# Patient Record
Sex: MALE | Race: WHITE | Employment: FULL TIME | URBAN - METROPOLITAN AREA
[De-identification: names, ages, dates, MRNs, and addresses within clinical notes are randomized per-mention and may not be internally consistent; named-entity substitution may affect disease eponyms.]

---

## 2017-07-14 ENCOUNTER — OFFICE VISIT (OUTPATIENT)
Dept: FAMILY MEDICINE | Facility: CLINIC | Age: 32
End: 2017-07-14
Payer: COMMERCIAL

## 2017-07-14 VITALS
TEMPERATURE: 98.4 F | OXYGEN SATURATION: 98 % | SYSTOLIC BLOOD PRESSURE: 138 MMHG | WEIGHT: 254 LBS | DIASTOLIC BLOOD PRESSURE: 88 MMHG | HEART RATE: 68 BPM | HEIGHT: 72 IN | BODY MASS INDEX: 34.4 KG/M2

## 2017-07-14 DIAGNOSIS — E66.9 NON MORBID OBESITY, UNSPECIFIED OBESITY TYPE: ICD-10-CM

## 2017-07-14 DIAGNOSIS — B00.1 RECURRENT COLD SORES: ICD-10-CM

## 2017-07-14 DIAGNOSIS — Z13.220 SCREENING FOR HYPERLIPIDEMIA: ICD-10-CM

## 2017-07-14 DIAGNOSIS — Z00.00 ROUTINE GENERAL MEDICAL EXAMINATION AT A HEALTH CARE FACILITY: Primary | ICD-10-CM

## 2017-07-14 DIAGNOSIS — Z13.1 SCREENING FOR DIABETES MELLITUS: ICD-10-CM

## 2017-07-14 PROCEDURE — 99395 PREV VISIT EST AGE 18-39: CPT | Performed by: FAMILY MEDICINE

## 2017-07-14 PROCEDURE — 36415 COLL VENOUS BLD VENIPUNCTURE: CPT | Performed by: FAMILY MEDICINE

## 2017-07-14 PROCEDURE — 80053 COMPREHEN METABOLIC PANEL: CPT | Performed by: FAMILY MEDICINE

## 2017-07-14 PROCEDURE — 80061 LIPID PANEL: CPT | Performed by: FAMILY MEDICINE

## 2017-07-14 RX ORDER — VALACYCLOVIR HYDROCHLORIDE 500 MG/1
500 TABLET, FILM COATED ORAL 2 TIMES DAILY
Qty: 6 TABLET | Refills: 3 | Status: SHIPPED | OUTPATIENT
Start: 2017-07-14 | End: 2020-05-04

## 2017-07-14 NOTE — PROGRESS NOTES
SUBJECTIVE:   CC: Randy Connor is an 32 year old male who presents for preventative health visit.     Physical   Annual:     Getting at least 3 servings of Calcium per day::  Yes    Bi-annual eye exam::  Yes    Dental care twice a year::  NO    Sleep apnea or symptoms of sleep apnea::  Daytime drowsiness and Sleep apnea    Diet::  Regular (no restrictions) and Breakfast skipped    Frequency of exercise::  1 day/week    Duration of exercise::  15-30 minutes    Taking medications regularly::  Yes    Medication side effects::  None    Additional concerns today::  No  Additional: feels like edema on left leg              Today's PHQ-2 Score:   PHQ-2 ( 1999 Pfizer) 7/14/2017   Q1: Little interest or pleasure in doing things 0   Q2: Feeling down, depressed or hopeless 0   PHQ-2 Score 0   Q1: Little interest or pleasure in doing things Not at all   Q2: Feeling down, depressed or hopeless Not at all   PHQ-2 Score 0       Abuse: Current or Past(Physical, Sexual or Emotional)- No  Do you feel safe in your environment - Yes    Social History   Substance Use Topics     Smoking status: Never Smoker     Smokeless tobacco: Never Used     Alcohol use 0.0 oz/week     0 Standard drinks or equivalent per week      Comment: 6 beers month     The patient does not drink >3 drinks per day nor >7 drinks per week.    Last PSA: No results found for: PSA    Reviewed orders with patient. Reviewed health maintenance and updated orders accordingly - Yes       Reviewed and updated as needed this visit by clinical staff    Reviewed and updated as needed this visit by Provider    Mild leg swelling.     Works at [employer] and some stress at work.     ROS:  C: NEGATIVE for fever, chills, change in weight  I: NEGATIVE for worrisome rashes, moles or lesions  E: NEGATIVE for vision changes or irritation  ENT: NEGATIVE for ear, mouth and throat problems  R: NEGATIVE for significant cough or SOB  CV: NEGATIVE for chest pain, palpitations or peripheral  "edema  GI: NEGATIVE for nausea, abdominal pain, heartburn, or change in bowel habits   male: negative for dysuria, hematuria, decreased urinary stream, erectile dysfunction, urethral discharge  M: NEGATIVE for significant arthralgias or myalgia  N: NEGATIVE for weakness, dizziness or paresthesias  E: NEGATIVE for temperature intolerance, skin/hair changes  P: NEGATIVE for changes in mood or affect    OBJECTIVE:   /88 (Cuff Size: Adult Large)  Pulse 68  Temp 98.4  F (36.9  C) (Oral)  Ht 6' 0.25\" (1.835 m)  Wt 254 lb (115.2 kg)  SpO2 98%  BMI 34.21 kg/m2    EXAM:  GENERAL: healthy, alert and no distress  EYES: Eyes grossly normal to inspection, PERRL and conjunctivae and sclerae normal  HENT: ear canals and TM's normal, nose and mouth without ulcers or lesions  NECK: no adenopathy, no asymmetry, masses, or scars and thyroid normal to palpation  RESP: lungs clear to auscultation - no rales, rhonchi or wheezes  CV: regular rate and rhythm, normal S1 S2, no S3 or S4, no murmur, click or rub, no peripheral edema and peripheral pulses strong  ABDOMEN: soft, nontender, no hepatosplenomegaly, no masses and bowel sounds normal   (male): testicles normal without atrophy or masses, no hernias and penis normal without urethral discharge  MS: no gross musculoskeletal defects noted, mild leg edema  SKIN: no suspicious lesions or rashes  NEURO: Normal strength and tone, mentation intact and speech normal  PSYCH: mentation appears normal, affect normal/bright    ASSESSMENT/PLAN:   1. Routine general medical examination at a health care facility       2. Screening for diabetes mellitus  - Comprehensive metabolic panel    3. Screening for hyperlipidemia  - LIPID REFLEX TO DIRECT LDL PANEL    4. Non morbid obesity, unspecified obesity type       5. Recurrent cold sores     - valACYclovir (VALTREX) 500 MG tablet; Take 1 tablet (500 mg) by mouth 2 times daily  Dispense: 6 tablet; Refill: 3  - Comprehensive metabolic " panel    COUNSELING:   Reviewed preventive health counseling, as reflected in patient instructions       Regular exercise       Healthy diet/nutrition       Vision screening       Hearing screening    BP Screening:   Last 3 BP Readings:    BP Readings from Last 3 Encounters:   07/14/17 138/88   04/11/16 130/86   08/21/15 120/76       The following was recommended to the patient:  Re-screen BP within a year and recommended lifestyle modifications     reports that he has never smoked. He has never used smokeless tobacco.    Estimated body mass index is 33.02 kg/(m^2) as calculated from the following:    Height as of 4/11/16: 6' (1.829 m).    Weight as of 4/11/16: 243 lb 8 oz (110.5 kg).   Weight management plan: Discussed healthy diet and exercise guidelines and patient will follow up in 12 months in clinic to re-evaluate.    Counseling Resources:  ATP IV Guidelines  Pooled Cohorts Equation Calculator  FRAX Risk Assessment  ICSI Preventive Guidelines  Dietary Guidelines for Americans, 2010  USDA's MyPlate  ASA Prophylaxis  Lung CA Screening    Nando Luciano MD, MD  [facility]  Answers for HPI/ROS submitted by the patient on 7/14/2017   PHQ-2 Score: 0

## 2017-07-14 NOTE — MR AVS SNAPSHOT
After Visit Summary   7/14/2017    Randy Connor    MRN: [id number]           Patient Information     Date Of Birth          1985        Visit Information        Provider Department      7/14/2017 4:00 PM Nando Luciano MD [facility]        Today's Diagnoses     Routine general medical examination at a health care facility    -  1    Screening for diabetes mellitus        Screening for hyperlipidemia        Non morbid obesity, unspecified obesity type        Recurrent cold sores          Care Instructions      Preventive Health Recommendations  Male Ages 26 - 39    Yearly exam:             See your health care provider every year in order to  o   Review health changes.   o   Discuss preventive care.    o   Review your medicines if your doctor has prescribed any.    You should be tested each year for STDs (sexually transmitted diseases), if you re at risk.     After age 35, talk to your provider about cholesterol testing. If you are at risk for heart disease, have your cholesterol tested at least every 5 years.     If you are at risk for diabetes, you should have a diabetes test (fasting glucose).  Shots: Get a flu shot each year. Get a tetanus shot every 10 years.     Nutrition:    Eat at least 5 servings of fruits and vegetables daily.     Eat whole-grain bread, whole-wheat pasta and brown rice instead of white grains and rice.     Talk to your provider about Calcium and Vitamin D.     Lifestyle    Exercise for at least 150 minutes a week (30 minutes a day, 5 days a week). This will help you control your weight and prevent disease.     Limit alcohol to one drink per day.     No smoking.     Wear sunscreen to prevent skin cancer.     See your dentist every six months for an exam and cleaning.             Follow-ups after your visit        Who to contact     If you have questions or need follow up information about today's clinic visit or your schedule please contact  "[facility] directly at [phone].  Normal or non-critical lab and imaging results will be communicated to you by MyChart, letter or phone within 4 business days after the clinic has received the results. If you do not hear from us within 7 days, please contact the clinic through [employer] or phone. If you have a critical or abnormal lab result, we will notify you by phone as soon as possible.  Submit refill requests through [employer] or call your pharmacy and they will forward the refill request to us. Please allow 3 business days for your refill to be completed.          Additional Information About Your Visit        [employer] Information     [employer] gives you secure access to your electronic health record. If you see a primary care provider, you can also send messages to your care team and make appointments. If you have questions, please call your primary care clinic.  If you do not have a primary care provider, please call [phone] and they will assist you.        Care EveryWhere ID     This is your Care EveryWhere ID. This could be used by other organizations to access your [place] medical records  [id number]        Your Vitals Were     Pulse Temperature Height Pulse Oximetry BMI (Body Mass Index)       68 98.4  F (36.9  C) (Oral) 6' 0.25\" (1.835 m) 98% 34.21 kg/m2        Blood Pressure from Last 3 Encounters:   07/14/17 138/88   04/11/16 130/86   08/21/15 120/76    Weight from Last 3 Encounters:   07/14/17 254 lb (115.2 kg)   04/11/16 243 lb 8 oz (110.5 kg)   08/21/15 240 lb (108.9 kg)              We Performed the Following     Comprehensive metabolic panel     LIPID REFLEX TO DIRECT LDL PANEL          Today's Medication Changes          These changes are accurate as of: 7/14/17  4:25 PM.  If you have any questions, ask your nurse or doctor.               Stop taking these medicines if you haven't already. Please contact your care team if you have questions.     hydrocortisone 0.2 % cream "   Commonly known as:  WESTCORT   Stopped by:  Nando Luciano MD                Where to get your medicines      These medications were sent to [facility] [place], [place] - [address]  [address], [facility] [address]     Phone:  [phone]     valACYclovir 500 MG tablet                Primary Care Provider Office Phone # Fax #    Franchesca Eliza Cason [phone]       [facility] [address]  [facility] [place] [address]        Equal Access to Services     [facility]: Hadii aad ku hadasho Soomaali, waaxda luqadaha, qaybta kaalmada adeegyada, natividad cordero . So [facility] [phone].    ATENCIÓN: Si habla español, tiene a raman disposición servicios gratuitos de asistencia lingüística. Llame al [phone].    We comply with applicable federal civil rights laws and [place] laws. We do not discriminate on the basis of race, color, national origin, age, disability sex, sexual orientation or gender identity.            Thank you!     Thank you for choosing [facility]  for your care. Our goal is always to provide you with excellent care. Hearing back from our patients is one way we can continue to improve our services. Please take a few minutes to complete the written survey that you may receive in the mail after your visit with us. Thank you!             Your Updated Medication List - Protect others around you: Learn how to safely use, store and throw away your medicines at www.disposemymeds.org.          This list is accurate as of: 7/14/17  4:25 PM.  Always use your most recent med list.                   Brand Name Dispense Instructions for use Diagnosis    NO ACTIVE MEDICATIONS           valACYclovir 500 MG tablet    VALTREX    6 tablet    Take 1 tablet (500 mg) by mouth 2 times daily    Recurrent cold sores

## 2017-07-14 NOTE — NURSING NOTE
"Chief Complaint   Patient presents with     Physical     pt is fasting        Initial /88 (Cuff Size: Adult Large)  Pulse 68  Temp 98.4  F (36.9  C) (Oral)  Ht 6' 0.25\" (1.835 m)  Wt 254 lb (115.2 kg)  SpO2 98%  BMI 34.21 kg/m2 Estimated body mass index is 34.21 kg/(m^2) as calculated from the following:    Height as of this encounter: 6' 0.25\" (1.835 m).    Weight as of this encounter: 254 lb (115.2 kg).  Medication Reconciliation: complete     Xi Trejo, MITCH      "

## 2017-07-17 LAB
ALBUMIN SERPL-MCNC: 3.8 G/DL (ref 3.4–5)
ALP SERPL-CCNC: 52 U/L (ref 40–150)
ALT SERPL W P-5'-P-CCNC: 55 U/L (ref 0–70)
ANION GAP SERPL CALCULATED.3IONS-SCNC: 6 MMOL/L (ref 3–14)
AST SERPL W P-5'-P-CCNC: 23 U/L (ref 0–45)
BILIRUB SERPL-MCNC: 0.4 MG/DL (ref 0.2–1.3)
BUN SERPL-MCNC: 12 MG/DL (ref 7–30)
CALCIUM SERPL-MCNC: 9.1 MG/DL (ref 8.5–10.1)
CHLORIDE SERPL-SCNC: 109 MMOL/L (ref 94–109)
CHOLEST SERPL-MCNC: 209 MG/DL
CO2 SERPL-SCNC: 27 MMOL/L (ref 20–32)
CREAT SERPL-MCNC: 1.07 MG/DL (ref 0.66–1.25)
GFR SERPL CREATININE-BSD FRML MDRD: 79 ML/MIN/1.7M2
GLUCOSE SERPL-MCNC: 98 MG/DL (ref 70–99)
HDLC SERPL-MCNC: 42 MG/DL
LDLC SERPL CALC-MCNC: 99 MG/DL
NONHDLC SERPL-MCNC: 167 MG/DL
POTASSIUM SERPL-SCNC: 4 MMOL/L (ref 3.4–5.3)
PROT SERPL-MCNC: 7.5 G/DL (ref 6.8–8.8)
SODIUM SERPL-SCNC: 142 MMOL/L (ref 133–144)
TRIGL SERPL-MCNC: 341 MG/DL

## 2019-11-04 ENCOUNTER — HEALTH MAINTENANCE LETTER (OUTPATIENT)
Age: 34
End: 2019-11-04

## 2020-05-04 ENCOUNTER — OFFICE VISIT (OUTPATIENT)
Dept: URGENT CARE | Facility: URGENT CARE | Age: 35
End: 2020-05-04
Payer: COMMERCIAL

## 2020-05-04 VITALS
BODY MASS INDEX: 35.69 KG/M2 | SYSTOLIC BLOOD PRESSURE: 160 MMHG | WEIGHT: 265 LBS | DIASTOLIC BLOOD PRESSURE: 98 MMHG | HEART RATE: 103 BPM | TEMPERATURE: 98.6 F | OXYGEN SATURATION: 98 %
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
## 2020-05-04 DIAGNOSIS — R03.0 ELEVATED BLOOD PRESSURE READING WITHOUT DIAGNOSIS OF HYPERTENSION: ICD-10-CM

## 2020-05-04 PROCEDURE — 99214 OFFICE O/P EST MOD 30 MIN: CPT | Performed by: NURSE PRACTITIONER

## 2020-05-04 RX ORDER — CEPHALEXIN 500 MG/1
500 CAPSULE ORAL 2 TIMES DAILY
Qty: 14 CAPSULE | Refills: 0 | Status: SHIPPED | OUTPATIENT
Start: 2020-05-04 | End: 2020-05-11

## 2020-05-04 NOTE — PROGRESS NOTES
SUBJECTIVE:  Randy Connor is a 35 year old male who presents to the clinic today for a rash.  Onset of rash was 4 day(s) ago.   Rash is sudden onset.  Location of the rash: right calf.  Quality/symptoms of rash: painful, discharge and red   Symptoms are mild and rash seems to be stable.  Previous history of a similar rash? No  Recent exposure history: woke up with a possible insect bite 4 days ago. Saw a white pustule on on it and tried picking at it which only made it worse. Now has swelling to the right calf and redness has spread.    Associated symptoms include: nothing.    Past Medical History:   Diagnosis Date     Obese 5/3/2013     Current Outpatient Medications   Medication Sig Dispense Refill     NO ACTIVE MEDICATIONS        Social History     Tobacco Use     Smoking status: Never Smoker     Smokeless tobacco: Never Used   Substance Use Topics     Alcohol use: Yes     Alcohol/week: 0.0 standard drinks     Comment: 6 beers month       ROS:  Review of systems negative except as stated above.    EXAM:   BP (!) 160/98   Pulse 103   Temp 98.6  F (37  C) (Tympanic)   Wt 120.2 kg (265 lb)   SpO2 98%   BMI 35.69 kg/m    GENERAL: alert, no acute distress.  SKIN: Rash description:    Distribution: generalized  Location: right posterior calf    Color: erythema and swelling with a small hole in the center. Measures 9 cm in diameter,  Lesion type: macular, round with warmth and swelling  GENERAL APPEARANCE: healthy, alert and no distress  EYES: EOMI,  PERRL, conjunctiva clear  NECK: supple, non-tender to palpation, no adenopathy noted  RESP: lungs clear to auscultation - no rales, rhonchi or wheezes  CV: regular rates and rhythm, normal S1 S2, no murmur noted    ASSESSMENT:  Infected bug bite  Elevated BP reading    PLAN:  1) See today's orders.  2) Follow-up with primary clinic if not improving  3) Recheck /90

## 2020-05-04 NOTE — PATIENT INSTRUCTIONS
Patient Education     Infected Insect Bite or Sting    When an insect stings you, it injects venom. When an insect bites you, it does not. Stings and bites may cause a local reaction. Or they may cause a reaction that affects your whole body. Bites and stings may become infected. Signs of infection include redness, warmth, pain, drainage of pus, and swelling. Infections will need treatment with antibiotics and should get better over the next 10 days. However, they can sometimes form an abscess (a pocket of pus) that needs to be opened by a healthcare provider to release the pus.   Home care  The following will help you care for your bite or sting at home:    If a stinger is still in your skin, it will need to be removed. Don't use tweezers. Gently scrape the stinger from the side with a firm object such as the side of a credit card. This will loosen it and remove it from your skin.    If itching is a problem, applying ice packs to the sting area will help.    Wash the area with soap and water at least 3 times a day. Apply a topical antibiotic cream or ointment.    You can use an over-the counter antihistamine unless your doctor has given you a prescription antihistamine. You may use antihistamines to reduce itching if large areas of the skin are involved. Use lower doses during the daytime and higher doses at bedtime since the drug may make you sleepy. Don't use an antihistamine if you have glaucoma or if you are a man with trouble urinating due to an enlarged prostate. Some antihistamines cause less drowsiness and are a good choice for daytime use.    If oral antibiotics have been prescribed, be sure to take them as directed until they are all finished.    You may use over-the-counter pain medicine to control pain, unless another pain medicine was prescribed. Talk with your doctor before using acetaminophen or ibuprofen if you have chronic liver or kidney disease. Also talk with your doctor if you have ever had a  "stomach ulcer or gastrointestinal bleeding.  Follow-up care  Follow up with your healthcare provider, or as advised if you don't get better over the next 2 days or if your symptoms get worse.  Call 911  Call 911 if any of these occur:    Swelling of the face, eyelids, mouth, throat, or tongue    Difficulty swallowing or breathing  When to seek medical advice  Call your healthcare provider right away if any of these occur:    Spreading areas of redness or swelling    Fever of 100.4 F (38 C) or higher, or as directed by your healthcare provider    Increased local pain, especially if the area starts feeling \"squishy\" like a water ballon.    Headache, fever, chills, muscle or joint aching, or vomiting,    New rash  Date Last Reviewed: 10/1/2016    7879-5172 [employer]. [address] [place] [address], [place] [address]. All rights reserved. This information is not intended as a substitute for professional medical care. Always follow your healthcare professional's instructions.           "

## 2020-05-06 ENCOUNTER — OFFICE VISIT (OUTPATIENT)
Dept: URGENT CARE | Facility: URGENT CARE | Age: 35
End: 2020-05-06
Payer: COMMERCIAL

## 2020-05-06 VITALS
WEIGHT: 255 LBS | SYSTOLIC BLOOD PRESSURE: 138 MMHG | TEMPERATURE: 98 F | BODY MASS INDEX: 34.35 KG/M2 | OXYGEN SATURATION: 98 % | DIASTOLIC BLOOD PRESSURE: 80 MMHG | HEART RATE: 88 BPM

## 2020-05-06 DIAGNOSIS — L03.115 CELLULITIS OF RIGHT LEG: Primary | ICD-10-CM

## 2020-05-06 PROCEDURE — 99214 OFFICE O/P EST MOD 30 MIN: CPT | Mod: 25 | Performed by: FAMILY MEDICINE

## 2020-05-06 PROCEDURE — 96372 THER/PROPH/DIAG INJ SC/IM: CPT | Performed by: FAMILY MEDICINE

## 2020-05-06 RX ORDER — SULFAMETHOXAZOLE/TRIMETHOPRIM 800-160 MG
1 TABLET ORAL 2 TIMES DAILY
Qty: 20 TABLET | Refills: 0 | Status: SHIPPED | OUTPATIENT
Start: 2020-05-06 | End: 2020-05-16

## 2020-05-06 RX ORDER — CEFTRIAXONE SODIUM 1 G
1 VIAL (EA) INJECTION ONCE
Status: COMPLETED | OUTPATIENT
Start: 2020-05-06 | End: 2020-05-06

## 2020-05-06 RX ADMIN — Medication 1 G: at 21:12

## 2020-05-07 NOTE — PATIENT INSTRUCTIONS
Stop keflex, start Bactrim  Elevate leg as much as possible  Warm compress to area of infection    Okay to take ibuprofen 200 mg - 4 tablets (800 mg) every 8 hours as needed.  Okay to take tylenol 500 mg - 2 tablets (1000 mg) every 6-8 hours as needed, do not exceed 3000 mg in 24 hours.      Patient Education     Cellulitis  Cellulitis is an infection of the deep layers of skin. A break in the skin, such as a cut or scratch, can let bacteria under the skin. If the bacteria get to deep layers of the skin, it can be serious. If not treated, cellulitis can get into the bloodstream and lymph nodes. The infection can then spread throughout the body. This causes serious illness.  Cellulitis causes the affected skin to become red, swollen, warm, and sore. The reddened areas have a visible border. An open sore may leak fluid (pus). You may have a fever, chills, and pain.  Cellulitis is treated with antibiotics taken for 7 to 10 days. An open sore may be cleaned and covered with cool wet gauze. Symptoms should get better 1 to 2 days after treatment is started. Make sure to take all the antibiotics for the full number of days until they are gone. Keep taking the medicine even if your symptoms go away.  Home care  Follow these tips:    Limit the use of the part of your body with cellulitis.     If the infection is on your leg, keep your leg raised while sitting. This will help to reduce swelling.    Take all of the antibiotic medicine exactly as directed until it is gone. Do not miss any doses, especially during the first 7 days. Don t stop taking the medicine when your symptoms get better.    Keep the affected area clean and dry.    Wash your hands with soap and warm water before and after touching your skin. Anyone else who touches your skin should also wash his or her hands. Don't share towels.  Follow-up care  Follow up with your healthcare provider, or as advised. If your infection does not go away on the first  antibiotic, your healthcare provider will prescribe a different one.  When to seek medical advice  Call your healthcare provider right away if any of these occur:    Red areas that spread    Swelling or pain that gets worse    Fluid leaking from the skin (pus)    Fever higher of 100.4  F (38.0  C) or higher after 2 days on antibiotics  Date Last Reviewed: 9/1/2016 2000-2019 [employer]. [address] [place] [address]. All rights reserved. This information is not intended as a substitute for professional medical care. Always follow your healthcare professional's instructions.

## 2020-05-07 NOTE — PROGRESS NOTES
SUBJECTIVE:  Chief Complaint   Patient presents with     Urgent Care     Derm Problem     right leg infection getting worse      Randy Connor is a 35 year old male who presents with a chief complaint of right calf.    Developed rash 2 1/2 days ago.  Endorsed pain and having some tingling sensation intermittently.  Patient states that swelling is worse, does improve after elevation over night.  Thinks that initial insult was due to bug bite.  Denies any fever.  Started on keflex twice a day but has gotten worse.    Past Medical History:   Diagnosis Date     Obese 5/3/2013     Current Outpatient Medications   Medication Sig Dispense Refill     cephALEXin (KEFLEX) 500 MG capsule Take 1 capsule (500 mg) by mouth 2 times daily for 7 days 14 capsule 0     NO ACTIVE MEDICATIONS        Social History     Tobacco Use     Smoking status: Never Smoker     Smokeless tobacco: Never Used   Substance Use Topics     Alcohol use: Yes     Alcohol/week: 0.0 standard drinks     Comment: 6 beers month       ROS:  Review of systems negative except as stated above.    EXAM:   /80 (BP Location: Right arm)   Pulse 88   Temp 98  F (36.7  C) (Tympanic)   Wt 115.7 kg (255 lb)   SpO2 98%   BMI 34.35 kg/m    M/S Exam:right lower leg - redden patch with erythema, swelling, warmth.  Small residual head.  Tissue indurated, not fluctuant.  Rash has extended beyond initial marking almost double.  GENERAL APPEARANCE: healthy, alert and no distress  EXTREMITIES: peripheral pulses normal  PSYCH: alert, affect bright    X-RAY was not done.    ASSESSMENT/PLAN:  (L03.115) Cellulitis of right leg  (primary encounter diagnosis)  Plan: cefTRIAXone (ROCEPHIN) injection 1 g,         sulfamethoxazole-trimethoprim (BACTRIM DS)         800-160 MG tablet            Worsening right lower leg infection, on keflex twice a day with no improvement.  Rocephin 1 gram IM given in clinic, will switch antibiotic - new RX Bactrim DS.  Recommend tylenol and  ibuprofen for discomfort, warm compress to area and elevation of leg    Follow up in clinic if no improvement of rash in 2 days.    Valentín Richard MD, MD  May 6, 2020 9:09 PM

## 2020-11-22 ENCOUNTER — HEALTH MAINTENANCE LETTER (OUTPATIENT)
Age: 35
End: 2020-11-22

## 2021-07-18 ENCOUNTER — OFFICE VISIT (OUTPATIENT)
Dept: URGENT CARE | Facility: URGENT CARE | Age: 36
End: 2021-07-18
Payer: COMMERCIAL

## 2021-07-18 VITALS
SYSTOLIC BLOOD PRESSURE: 142 MMHG | HEART RATE: 97 BPM | TEMPERATURE: 97.6 F | OXYGEN SATURATION: 99 % | DIASTOLIC BLOOD PRESSURE: 98 MMHG

## 2021-07-18 DIAGNOSIS — S61.412A LACERATION OF LEFT HAND WITHOUT FOREIGN BODY, INITIAL ENCOUNTER: Primary | ICD-10-CM

## 2021-07-18 PROCEDURE — 12002 RPR S/N/AX/GEN/TRNK2.6-7.5CM: CPT | Performed by: PHYSICIAN ASSISTANT

## 2021-07-18 RX ORDER — CEPHALEXIN 500 MG/1
500 CAPSULE ORAL 3 TIMES DAILY
Qty: 21 CAPSULE | Refills: 0 | Status: SHIPPED | OUTPATIENT
Start: 2021-07-18 | End: 2021-07-25

## 2021-07-18 ASSESSMENT — PAIN SCALES - GENERAL: PAINLEVEL: NO PAIN (0)

## 2021-07-18 NOTE — PROGRESS NOTES
SUBJECTIVE:     Chief Complaint   Patient presents with     Urgent Care     Laceration     left thumb cut by knife at home.      Randy Connor is a 36 year old male who presents to the clinic with a laceration on the left hand sustained 1 hour(s) ago.  This is a non-work related injury.    Mechanism of injury: knife.    Associated symptoms: Denies numbness, weakness, or loss of function  Last tetanus booster within 10 years: no    EXAM:   The patient appears today in alert,no apparent distress distress  VITALS: BP (!) 142/98   Pulse 97   Temp 97.6  F (36.4  C) (Tympanic)   SpO2 99%     Size of laceration: 4 centimeters  Characteristics of the laceration: bleeding- mild and straight  Tendon function intact: yes  Sensation to light touch intact: yes  Pulses intact: not applicable  Picture included in patient's chart: no    Assessment:    (S61.412A) Laceration of left hand without foreign body, initial encounter  (primary encounter diagnosis)  Plan: TDAP VACCINE (Adacel, Boostrix)  [[id number]],         cephALEXin (KEFLEX) 500 MG capsule, REPAIR         SUPERFICIAL, WOUND BODY < =2.5CM             PLAN:  PROCEDURE NOTE::  Wound was locally injected with 3 cc's of Lidocaine 2% plain  Wound cleaned with Shur-Clens  Laceration was closed using 7 4-0 nylon interrupted sutures  After care instructions:  Keep wound clean and dry for the next 24-48 hours  Signs of infection discussed today  May return to work as long as wound is kept clean and dry  Discussed the probability of scarring

## 2021-07-18 NOTE — PATIENT INSTRUCTIONS
Keep dry and rested for 24-48 hours, then bandage change at least daily.    Follow up right away with redness, increased pain, discharge, fever, sense of illness    Removed in 10 days      Patient Education     Laceration of an Arm or Leg: Stitches, Staples, or Tape   A laceration is a cut through the skin. If it's deep or it's gaping open, it may require stitches or staples to close so it can heal. Minor cuts may be treated with surgical tape closures, or skin glue.   X-rays may be done if something may have entered the skin through the cut. You may also need a tetanus shot if you are not up to date on this vaccine.   Home care    Follow the healthcare provider s instructions on how to care for the cut.    Wash your hands with soap and clean, running water before and after caring for your wound. This is to help prevent infection.    Keep the wound clean and dry. If a bandage was applied and it becomes wet or dirty, replace it. Otherwise, leave it in place for the first 24 hours, then change it once a day or as directed.    If stitches or staples were used, clean the wound daily:  ? After removing the bandage, wash the area with soap and water. Use a wet cotton swab to loosen and remove any blood or crust that forms.  ? After cleaning, keep the wound clean and dry. Talk with your healthcare provider before putting any antibiotic ointment on the wound. Reapply the bandage.    Remove the bandage to shower as usual after the first 24 hours, but don't soak the area in water (no swimming) until the stitches or staples are removed.    If surgical tape closures were used, keep the area clean and dry. If it becomes wet, blot it dry with a towel. Let the surgical tape fall off on its own.    Follow the healthcare provider's instructions for any medicines prescribed.  ? The provider may prescribe an antibiotic cream or ointment to prevent infection. He or she may also prescribe an antibiotic pill. Don't stop taking this  medicine until you have finished it all or the provider tells you to stop.  ? The provider may also prescribe medicine for pain. Follow the instructions exactly for how to take these medicines.    Don't do activities that may reopen your wound.    Follow-up care  Follow up with your healthcare provider, or as advised. Most skin wounds heal within 10 days. But an infection may sometimes occur even with proper treatment. Check the wound daily for the signs of infection listed below. Stitches and staples should be removed within 7 to14 days. If surgical tape closures were used, you may remove them after 10 days if they have not fallen off by then.    When to seek medical advice  Call your healthcare provider right away if any of these occur:    Wound bleeding not controlled by direct pressure    Signs of infection, including increasing pain in the wound, increasing wound redness or swelling, or pus or bad odor coming from the wound    Chills, fever of 100.4 F (38 C) or higher, or as directed by your healthcare provider    Stitches or staples coming apart or falling out or surgical tape falling off before 7 days    Wound edges reopening    Color changes in the wound    Numbness around the wound     Decreased movement around the injured area  [employer] last reviewed this educational content on 6/1/2020 2000-2021 [employer]. All rights reserved. This information is not intended as a substitute for professional medical care. Always follow your healthcare professional's instructions.

## 2021-07-28 ENCOUNTER — ALLIED HEALTH/NURSE VISIT (OUTPATIENT)
Dept: PEDIATRICS | Facility: CLINIC | Age: 36
End: 2021-07-28
Payer: COMMERCIAL

## 2021-07-28 DIAGNOSIS — Z48.02 ENCOUNTER FOR REMOVAL OF SUTURES: Primary | ICD-10-CM

## 2021-07-28 PROCEDURE — 99207 PR NO CHARGE NURSE ONLY: CPT

## 2021-07-28 NOTE — PROGRESS NOTES
Randy TAMAYO Connor presents to the clinic for removal of sutures and sutures,staples, steri strips. The patient has had sutures in place for 10 days. There has been no patient reported signs or symptoms of infection or drainage. 7  sutures and sutures,staples, staple, steri strips are seen and located on the left thumb. Tetanus status is up to date. All sutures and sutures,staples, steri strips were easily removed today. Routine wound care discussed by the RN or provider. The patient will follow up as needed.    Cate Gaytan RN on 7/28/2021 at 12:04 PM

## 2021-09-19 ENCOUNTER — HEALTH MAINTENANCE LETTER (OUTPATIENT)
Age: 36
End: 2021-09-19

## 2022-01-08 ENCOUNTER — HEALTH MAINTENANCE LETTER (OUTPATIENT)
Age: 37
End: 2022-01-08

## 2022-06-04 ENCOUNTER — OFFICE VISIT (OUTPATIENT)
Dept: URGENT CARE | Facility: URGENT CARE | Age: 37
End: 2022-06-04
Payer: COMMERCIAL

## 2022-06-04 VITALS
DIASTOLIC BLOOD PRESSURE: 94 MMHG | HEART RATE: 89 BPM | OXYGEN SATURATION: 98 % | SYSTOLIC BLOOD PRESSURE: 138 MMHG | TEMPERATURE: 96.9 F
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
## 2022-06-04 DIAGNOSIS — S30.861A TICK BITE OF ABDOMEN, INITIAL ENCOUNTER: Primary | ICD-10-CM

## 2022-06-04 PROCEDURE — 99213 OFFICE O/P EST LOW 20 MIN: CPT | Performed by: FAMILY MEDICINE

## 2022-06-04 RX ORDER — DOXYCYCLINE 100 MG/1
200 CAPSULE ORAL ONCE
Qty: 2 CAPSULE | Refills: 0 | Status: SHIPPED | OUTPATIENT
Start: 2022-06-04 | End: 2022-06-04

## 2022-06-04 NOTE — PROGRESS NOTES
SUBJECTIVE:  Randy Connor, a 37 year old male scheduled an appointment to discuss the following issues:  Tick bite of abdomen, initial encounter    Medical, social, surgical, and family histories reviewed.     Urgent Care (Pt found a tick on his hip last night. )  Found a dog tick at his right lateral abdomen yesterday---tick was not embedded, still alive, brought it in a container.  Mild rash at right lateral abdomen.  No join pain; otherwise asymptomatic.      ROS:  See HPI.  No nausea/vomiting.  No fever/chills.  No chest pain/SOB.  No BM/urine problems.  No dizziness or syncope.      OBJECTIVE:  BP (!) 138/94   Pulse 89   Temp 96.9  F (36.1  C) (Tympanic)   SpO2 98%   EXAM:  GENERAL APPEARANCE: alert and no distress, afebrile  HENNT: normal, no adenopathy, no thyromegaly  RESP: breathing esy  CV: regular rates and rhythm  ABDOMEN: soft, nontender  MS: extremities normal- no gross deformities noted  SKIN:  Tick bite wound slight 1mm dot, no erythema migrans, no induration  NEURO: Normal strength and tone, mentation intact and speech normal    ASSESSMENT/PLAN:  (S30.861A,  W57.XXXA) Tick bite of abdomen, initial encounter  (primary encounter diagnosis)  Comment: dog tick unlikely to transmit lymes---will use 2 tabs of Doxycycline for Lymes prophylaxis  Plan: doxycycline hyclate (VIBRAMYCIN) 100 MG capsule          Care instructions given.  Pt to f/up PCP within 1 week if no improvement or worsening.  Warning signs and symptoms explained.

## 2022-08-15 ENCOUNTER — OFFICE VISIT (OUTPATIENT)
Dept: URGENT CARE | Facility: URGENT CARE | Age: 37
End: 2022-08-15
Payer: COMMERCIAL

## 2022-08-15 VITALS
DIASTOLIC BLOOD PRESSURE: 102 MMHG | TEMPERATURE: 99 F | OXYGEN SATURATION: 99 % | SYSTOLIC BLOOD PRESSURE: 150 MMHG | HEART RATE: 78 BPM

## 2022-08-15 DIAGNOSIS — H66.90 EAR INFECTION: ICD-10-CM

## 2022-08-15 DIAGNOSIS — H60.332 ACUTE SWIMMER'S EAR OF LEFT SIDE: Primary | ICD-10-CM

## 2022-08-15 PROCEDURE — 99213 OFFICE O/P EST LOW 20 MIN: CPT | Performed by: PHYSICIAN ASSISTANT

## 2022-08-15 RX ORDER — CIPROFLOXACIN AND DEXAMETHASONE 3; 1 MG/ML; MG/ML
4 SUSPENSION/ DROPS AURICULAR (OTIC) 2 TIMES DAILY
Qty: 2 ML | Refills: 0 | Status: SHIPPED | OUTPATIENT
Start: 2022-08-15 | End: 2022-08-20

## 2022-08-15 NOTE — PROGRESS NOTES
SUBJECTIVE:  Randy Connor is a 37 year old male who presents with left ear pain, fullness and discharge for 1 day(s).   Severity: moderate   Timing:gradual onset  Additional symptoms include fever of 99.    He describes it as a build up of fluid and then it started becoming painful last night. Ear was warm to the touch and red with some drainage.  History of recurrent otitis: yes    Past Medical History:   Diagnosis Date     Obese 5/3/2013     No current outpatient medications on file.     Social History     Tobacco Use     Smoking status: Never Smoker     Smokeless tobacco: Never Used   Substance Use Topics     Alcohol use: Yes     Alcohol/week: 0.0 standard drinks     Comment: 6 beers month       ROS:   Review of systems negative except as stated above.    OBJECTIVE:  BP (!) 150/102   Pulse 78   Temp 99  F (37.2  C)   SpO2 99%    EXAM:  The right TM is normal: no effusions, no erythema, and normal landmarks and scarred     The right auditory canal is normal and without drainage, edema or erythema  The left TM is normal: no effusions, no erythema, and normal landmarks and scarred  The left auditory canal is erythematous, swollen and tender  Oropharynx exam is normal: no lesions, erythema, adenopathy or exudate.  GENERAL: no acute distress  EYES: EOMI,  PERRL, conjunctiva clear  NECK: supple, non-tender to palpation, no adenopathy noted  RESP: lungs clear to auscultation - no rales, rhonchi or wheezes  CV: regular rates and rhythm, normal S1 S2, no murmur noted  SKIN: no suspicious lesions or rashes     ASSESSMENT:  Otitis Externa, left    PLAN:    Warm compresses and Ibuprofen/Tylenol for ear pain

## 2022-09-19 ENCOUNTER — OFFICE VISIT (OUTPATIENT)
Dept: URGENT CARE | Facility: URGENT CARE | Age: 37
End: 2022-09-19
Payer: COMMERCIAL

## 2022-09-19 VITALS
OXYGEN SATURATION: 100 % | RESPIRATION RATE: 20 BRPM | HEART RATE: 98 BPM | BODY MASS INDEX: 34.61 KG/M2 | WEIGHT: 257 LBS | DIASTOLIC BLOOD PRESSURE: 84 MMHG | TEMPERATURE: 101.5 F | SYSTOLIC BLOOD PRESSURE: 148 MMHG

## 2022-09-19 DIAGNOSIS — R50.9 FEVER, UNSPECIFIED FEVER CAUSE: ICD-10-CM

## 2022-09-19 DIAGNOSIS — H65.91 OME (OTITIS MEDIA WITH EFFUSION), RIGHT: Primary | ICD-10-CM

## 2022-09-19 LAB
DEPRECATED S PYO AG THROAT QL EIA: NEGATIVE
GROUP A STREP BY PCR: NOT DETECTED

## 2022-09-19 PROCEDURE — 99213 OFFICE O/P EST LOW 20 MIN: CPT | Performed by: PHYSICIAN ASSISTANT

## 2022-09-19 PROCEDURE — 87651 STREP A DNA AMP PROBE: CPT | Performed by: PHYSICIAN ASSISTANT

## 2022-09-19 NOTE — PROGRESS NOTES
SUBJECTIVE:  Randy Connor is a 37 year old male who comes in with a 3-day history of right ear pain.  Has also had a fever.  He does have mild pain in his throat that radiates into his ear.  He has been [place] with [place] and denies any exposure that he is aware of.  He denies any cough or cold symptoms and no shortness of breath.  He is able to eat and drink well.  Is not concerned for COVID.  He does have history of ear issues and was recently treated for left-sided otitis externa .        Past Medical History:   Diagnosis Date     Obese 5/3/2013     No current outpatient medications on file.     No current facility-administered medications for this visit.     Social History     Socioeconomic History     Marital status: Single     Spouse name: Not on file     Number of children: Not on file     Years of education: Not on file     Highest education level: Not on file   Occupational History     Not on file   Tobacco Use     Smoking status: Never Smoker     Smokeless tobacco: Never Used   Substance and Sexual Activity     Alcohol use: Yes     Alcohol/week: 0.0 standard drinks     Comment: 6 beers month     Drug use: No     Sexual activity: Yes     Partners: Female     Birth control/protection: OCP   Other Topics Concern     Parent/sibling w/ CABG, MI or angioplasty before 65F 55M? No   Social History Narrative     Not on file     Social Determinants of Health     Financial Resource Strain: Not on file   Food Insecurity: Not on file   Transportation Needs: Not on file   Physical Activity: Not on file   Stress: Not on file   Social Connections: Not on file   Intimate Partner Violence: Not on file   Housing Stability: Not on file     ROS  Negative other than stated above    Exam:  GENERAL APPEARANCE: healthy, alert and no distress  EYES: EOMI,  PERRL  HENT: Right TM erythematous and bulging.  Canal is clear.  No signs of mastoiditis.   left TM and canal is clear.  Oral mucosa moist with no erythema or  exudate noted.  No significant nasal congestion noted.  RESP: lungs clear to auscultation - no rales, rhonchi or wheezes  CV: regular rates and rhythm, normal S1 S2, no S3 or S4 and no murmur, click or rub -  SKIN: no suspicious lesions or rashes    Results for orders placed or performed in visit on 09/19/22   Streptococcus A Rapid Screen w/Reflex to PCR - Clinic Collect     Status: Normal    Specimen: Throat; Swab   Result Value Ref Range    Group A Strep antigen Negative Negative   Group A Streptococcus PCR Throat Swab     Status: Normal    Specimen: Throat; Swab   Result Value Ref Range    Group A strep by PCR Not Detected Not Detected    Narrative    The Xpert Xpress Strep A test, performed on the [employer], is a rapid, qualitative in vitro diagnostic test for the detection of Streptococcus pyogenes (Group A ß-hemolytic Streptococcus, Strep A) in throat swab specimens from patients with signs and symptoms of pharyngitis. The Xpert Xpress Strep A test can be used as an aid in the diagnosis of Group A Streptococcal pharyngitis. The assay is not intended to monitor treatment for Group A Streptococcus infections. The Xpert Xpress Strep A test utilizes an automated real-time polymerase chain reaction (PCR) to detect Streptococcus pyogenes DNA.     assessment/plan:  (H65.91) OME (otitis media with effusion), right  (primary encounter diagnosis)  Comment:   Plan: amoxicillin-clavulanate (AUGMENTIN) 875-125 MG         tablet        Patient with a 3-day history of right ear pain and fever.  Exam does reveal otitis media on the right.  Strep was negative.  He has no other associated symptoms.  Exam is otherwise unremarkable.  Will start on Augmentin as directed.  Side effects of medications were reviewed and advised to take with food.  May use over-the-counter medication as needed for fever and pain control.  Signs of perforation were discussed and will follow-up with primary as needed    (R50.9)  Fever, unspecified fever cause  Comment:   Plan: Streptococcus A Rapid Screen w/Reflex to PCR -         Clinic Collect, Group A Streptococcus PCR         Throat Swab        As above

## 2022-11-20 ENCOUNTER — HEALTH MAINTENANCE LETTER (OUTPATIENT)
Age: 37
End: 2022-11-20

## 2023-04-15 ENCOUNTER — HEALTH MAINTENANCE LETTER (OUTPATIENT)
Age: 38
End: 2023-04-15

## 2023-05-21 ENCOUNTER — OFFICE VISIT (OUTPATIENT)
Dept: URGENT CARE | Facility: URGENT CARE | Age: 38
End: 2023-05-21
Payer: COMMERCIAL

## 2023-05-21 VITALS
SYSTOLIC BLOOD PRESSURE: 118 MMHG | DIASTOLIC BLOOD PRESSURE: 70 MMHG | BODY MASS INDEX: 32.19 KG/M2 | TEMPERATURE: 98.6 F | HEART RATE: 75 BPM | WEIGHT: 239 LBS | RESPIRATION RATE: 14 BRPM | OXYGEN SATURATION: 99 %

## 2023-05-21 DIAGNOSIS — H60.332 ACUTE SWIMMER'S EAR OF LEFT SIDE: Primary | ICD-10-CM

## 2023-05-21 DIAGNOSIS — H93.13 TINNITUS, BILATERAL: ICD-10-CM

## 2023-05-21 PROCEDURE — 99213 OFFICE O/P EST LOW 20 MIN: CPT | Performed by: PHYSICIAN ASSISTANT

## 2023-05-21 RX ORDER — LOSARTAN POTASSIUM 25 MG/1
1 TABLET ORAL
COMMUNITY
Start: 2023-04-06

## 2023-05-21 RX ORDER — CIPROFLOXACIN AND DEXAMETHASONE 3; 1 MG/ML; MG/ML
4 SUSPENSION/ DROPS AURICULAR (OTIC) 2 TIMES DAILY
Qty: 2.8 ML | Refills: 0 | Status: SHIPPED | OUTPATIENT
Start: 2023-05-21 | End: 2023-05-28

## 2023-05-21 RX ORDER — FLUOCINONIDE 0.5 MG/G
OINTMENT TOPICAL 2 TIMES DAILY
COMMUNITY
Start: 2023-04-03

## 2023-05-21 NOTE — PROGRESS NOTES
ASSESSMENT/PLAN:    (H60.332) Acute swimmer's ear of left side  (primary encounter diagnosis)    MDM: 38 year old male, with a self reported history of chronic, intermittent outer ear canal infections (states has about 5 per year) and chronic waxing and waning tinnitus (estimated duration of many months to one year duration). OE on exam today. Please also see the below AVS/Plan--which I reviewed with patient verbally and provided in printed form for home review. Criteria for urgent and emergent follow-up are also reviewed.     Plan: ciprofloxacin-dexamethasone (CIPRODEX) 0.3-0.1         % otic suspension, Adult ENT  Referral        May 21, 2023 [facility] Plan:     1. Start the antibiotic ear drops prescribed here today.   2. Try to keep the ear dry, as we discussed today, until the infection has cleared   3. Follow-up with primary care provider, urgent care, or an ear, nose and throat doctor if no improvement after 3 days of antibiotics, if any sudden change, worsening of current symptoms, onset of new illness symptoms, or if symptoms are not fully resolved in the next 7 days with treatment provided here today.     4. Due to your recurrent problems with otitis externa (outer ear canal infection), and your report of chronic tinnitus, I have provided a referral to see an ENT doctor.     5.  We also reviewed the below urgent and emergency signs and symptoms that require immediate medical follow-up.       Ear pain becomes worse or doesn t improve after 3 days of treatment    Redness or swelling of the outer ear occurs or gets worse    Headache    Fever of 100.4 F (38 C) or higher, or as directed by your healthcare provider  Call 911  Call 911 or get immediate medical care if any of the following occur:     Seizure    Unusual drowsiness or confusion    Unusual painful or stiff neck       (H93.13) Tinnitus, bilateral  ---------------          SUBJECTIVE:    Randy Connor is a 38 year old male, with a  "self reported history of chronic, intermittent outer ear canal infections (states has about 5 per year) and chronic waxing and waning tinnitus (estimated duration of many months to one year duration), presenting to urgent care today for suspected ear left ear infection. Patient reports he has not seen an ENT doctor yet, but states he has been thinking he'd like to see and ENT.     HPI: Patient states he has a history of eczema that he believes causes some \"cracked skin\" left outer ear canal about 2 weeks ago. He started having some pain about 2 weeks ago in left outer ear canal that has worsened this week. He did note some \"white liquid\" when trying to clean left ear canal, but otherwise denies any purulent or bloody drainage from left ear.     ROS: No associated fever or chills. No acute nasal/sinus congestion. No acute purulent or bloody drainage from ears. No acute swelling, heat or redness around ear. No acute facial or lateral neck swelling. No acute sore throat or difficulty swallowing. No acute chest pain or shortness of breath. No acute headache or neck pain/stiffness.     Past Medical History:   Diagnosis Date     Obese 5/3/2013       Patient Active Problem List   Diagnosis     Recurrent cold sores     Eczema     CARDIOVASCULAR SCREENING; LDL GOAL LESS THAN 160     Obese       Current Outpatient Medications   Medication     fluocinonide (LIDEX) 0.05 % external ointment     losartan (COZAAR) 25 MG tablet     No current facility-administered medications for this visit.       No Known Allergies      OBJECTIVE:   /70 (BP Location: Right arm, Patient Position: Chair, Cuff Size: Adult Regular)   Pulse 75   Temp 98.6  F (37  C) (Oral)   Resp 14   Wt 108.4 kg (239 lb)   SpO2 99%   BMI 32.19 kg/m          General appearance: alert and no apparent distress  Skin color is uniform in color and without rash.  HEENT:   Conjunctiva not injected.  Sclera clear.  Right TM is normal: no effusions, no erythema, " and normal landmarks.  Left external canal is moderatly erythematous and moderatly edematous. Purulent debris is present. Mild discomfort with manipulation of tragus today. No eliazar auricular or mastoid redness, swelling, heat or tenderness.  No lateral neck or face swelling. I am only able to see approximately 50% of TM today is normal in color and appears intact.   Nasal mucosa is normal.  Oropharyngeal exam is normal: no lesions, erythema, adenopathy or exudate.  Neck is supple, FROM with no adenopathy  CARDIAC:NORMAL - regular rate and rhythm without murmur.  RESP: Normal - CTA without rales, rhonchi, or wheezing.  NEURO: Alert and oriented.  Normal speech and mentation.  CN II/XII grossly intact.  Gait within normal limits.

## 2023-05-21 NOTE — PATIENT INSTRUCTIONS
May 21, 2023 [facility] Plan:     1. Start the antibiotic ear drops prescribed here today.   2. Try to keep the ear dry, as we discussed today, until the infection has cleared   3. Follow-up with primary care provider, urgent care, or an ear, nose and throat doctor if no improvement after 3 days of antibiotics, if any sudden change, worsening of current symptoms, onset of new illness symptoms, or if symptoms are not fully resolved in the next 7 days with treatment provided here today.     4. Due to your recurrent problems with otitis externa (outer ear canal infection), and your report of chronic tinnitus, I have provided a referral to see an ENT doctor.     5.  We also reviewed the below urgent and emergency signs and symptoms that require immediate medical follow-up.     Ear pain becomes worse or doesn t improve after 3 days of treatment  Redness or swelling of the outer ear occurs or gets worse  Headache  Fever of 100.4 F (38 C) or higher, or as directed by your healthcare provider  Call 911  Call 911 or get immediate medical care if any of the following occur:   Seizure  Unusual drowsiness or confusion  Unusual painful or stiff neck

## 2023-05-22 ENCOUNTER — TELEPHONE (OUTPATIENT)
Dept: OTOLARYNGOLOGY | Facility: CLINIC | Age: 38
End: 2023-05-22
Payer: COMMERCIAL

## 2023-05-22 NOTE — TELEPHONE ENCOUNTER
FUTURE VISIT INFORMATION      FUTURE VISIT INFORMATION:    Date: 9/26/23    Time: 8:45am    Location: [facility] [place]  REFERRAL INFORMATION:    Referring provider:  Servando Salazar PA-C    Referring providers clinic:    URGENT CARE.    Reason for visit/diagnosis  Acute swimmer's ear of left side. Recurrent Left Otitis Externa and Tinnitus. Referred by      Servando Salazar PA-C in  UR[facility]ENT CARE. Appt ,per pt. Recs in [facility].    RECORDS REQUESTED FROM:       Clinic name Comments Records Status Imaging Status     [facility] C[facility].  5/21/23- note with Servando Salazar PA-C [facility]     [facility] 3/31/23- note with Марина Gay PA    1/2/19- note with Andi Berumen MD   CE

## 2023-05-22 NOTE — TELEPHONE ENCOUNTER
Health Call Center    Phone Message    May a detailed message be left on voicemail: yes     Reason for Call: Appointment Intake    Referring Provider Name: Servando Salazar PA-C in  URGENT CARE  Diagnosis and/or Symptoms: Acute swimmer's ear of left side. Recurrent Left Otitis Externa and Tinnitus.    Referral has 1-2 week priority but soonest available is September 26th. Please call pt if able to get in sooner. Thank you.       Action Taken: Message routed to:  [facility]    Travel Screening: Not Applicable

## 2023-05-23 ENCOUNTER — TELEPHONE (OUTPATIENT)
Dept: OTOLARYNGOLOGY | Facility: CLINIC | Age: 38
End: 2023-05-23
Payer: COMMERCIAL

## 2023-05-24 ENCOUNTER — PRE VISIT (OUTPATIENT)
Dept: OTOLARYNGOLOGY | Facility: CLINIC | Age: 38
End: 2023-05-24

## 2023-07-05 NOTE — TELEPHONE ENCOUNTER
FUTURE VISIT INFORMATION:      FUTURE VISIT INFORMATION:    Date: 9/26/23    Time: 8:45 AM    [place] [facility] [place]FORMATION:    Referring provider:  Servando Salazar PA-C    Referring providers clinic:    URGENT CARE.    Reason for visit/diagnosis  Acute swimmer's ear of left side. Recurrent Left Otitis Externa and Tinnitus. Referred by  Servando Salazar PA-C in  URGENT CARE. Appt ,per pt. Recs in [facility].     RECORDS REQUESTED FROM:         Clinic name Comments Records Status Imaging Status    UR[facility]ENT CARE. 5/21/23- note with Servando Salazar PA-C [facility]      [facility] 3/31/23- note with Марина Gay PA    1/2/19- note with Andi Berumen MD   [facility]

## 2023-08-29 DIAGNOSIS — Z01.10 ENCOUNTER FOR HEARING TEST: Primary | ICD-10-CM

## 2023-09-26 ENCOUNTER — PRE VISIT (OUTPATIENT)
Dept: OTOLARYNGOLOGY | Facility: CLINIC | Age: 38
End: 2023-09-26

## 2024-06-22 ENCOUNTER — HEALTH MAINTENANCE LETTER (OUTPATIENT)
Age: 39
End: 2024-06-22

## 2025-07-12 ENCOUNTER — HEALTH MAINTENANCE LETTER (OUTPATIENT)
Age: 40
End: 2025-07-12